# Patient Record
Sex: MALE | Race: OTHER | HISPANIC OR LATINO | URBAN - METROPOLITAN AREA
[De-identification: names, ages, dates, MRNs, and addresses within clinical notes are randomized per-mention and may not be internally consistent; named-entity substitution may affect disease eponyms.]

---

## 2023-02-13 ENCOUNTER — EMERGENCY (EMERGENCY)
Facility: HOSPITAL | Age: 53
LOS: 1 days | Discharge: ANOTHER HOSPITAL | End: 2023-02-13
Attending: STUDENT IN AN ORGANIZED HEALTH CARE EDUCATION/TRAINING PROGRAM
Payer: MEDICAID

## 2023-02-13 VITALS
HEIGHT: 68 IN | DIASTOLIC BLOOD PRESSURE: 90 MMHG | RESPIRATION RATE: 16 BRPM | WEIGHT: 165.35 LBS | SYSTOLIC BLOOD PRESSURE: 137 MMHG | HEART RATE: 63 BPM | TEMPERATURE: 98 F | OXYGEN SATURATION: 96 %

## 2023-02-13 VITALS
RESPIRATION RATE: 18 BRPM | TEMPERATURE: 99 F | DIASTOLIC BLOOD PRESSURE: 94 MMHG | SYSTOLIC BLOOD PRESSURE: 145 MMHG | HEART RATE: 82 BPM | OXYGEN SATURATION: 95 %

## 2023-02-13 LAB
ALBUMIN SERPL ELPH-MCNC: 3.6 G/DL — SIGNIFICANT CHANGE UP (ref 3.5–5)
ALP SERPL-CCNC: 61 U/L — SIGNIFICANT CHANGE UP (ref 40–120)
ALT FLD-CCNC: 32 U/L DA — SIGNIFICANT CHANGE UP (ref 10–60)
ANION GAP SERPL CALC-SCNC: 5 MMOL/L — SIGNIFICANT CHANGE UP (ref 5–17)
APTT BLD: 33.9 SEC — SIGNIFICANT CHANGE UP (ref 27.5–35.5)
AST SERPL-CCNC: 26 U/L — SIGNIFICANT CHANGE UP (ref 10–40)
BASOPHILS # BLD AUTO: 0.02 K/UL — SIGNIFICANT CHANGE UP (ref 0–0.2)
BASOPHILS NFR BLD AUTO: 0.3 % — SIGNIFICANT CHANGE UP (ref 0–2)
BILIRUB SERPL-MCNC: 0.5 MG/DL — SIGNIFICANT CHANGE UP (ref 0.2–1.2)
BLD GP AB SCN SERPL QL: SIGNIFICANT CHANGE UP
BUN SERPL-MCNC: 16 MG/DL — SIGNIFICANT CHANGE UP (ref 7–18)
CALCIUM SERPL-MCNC: 8.9 MG/DL — SIGNIFICANT CHANGE UP (ref 8.4–10.5)
CHLORIDE SERPL-SCNC: 106 MMOL/L — SIGNIFICANT CHANGE UP (ref 96–108)
CO2 SERPL-SCNC: 25 MMOL/L — SIGNIFICANT CHANGE UP (ref 22–31)
CREAT SERPL-MCNC: 0.85 MG/DL — SIGNIFICANT CHANGE UP (ref 0.5–1.3)
EGFR: 105 ML/MIN/1.73M2 — SIGNIFICANT CHANGE UP
EOSINOPHIL # BLD AUTO: 0.01 K/UL — SIGNIFICANT CHANGE UP (ref 0–0.5)
EOSINOPHIL NFR BLD AUTO: 0.2 % — SIGNIFICANT CHANGE UP (ref 0–6)
GLUCOSE SERPL-MCNC: 129 MG/DL — HIGH (ref 70–99)
HCT VFR BLD CALC: 46.3 % — SIGNIFICANT CHANGE UP (ref 39–50)
HGB BLD-MCNC: 15.5 G/DL — SIGNIFICANT CHANGE UP (ref 13–17)
IMM GRANULOCYTES NFR BLD AUTO: 0.3 % — SIGNIFICANT CHANGE UP (ref 0–0.9)
INR BLD: 1.05 RATIO — SIGNIFICANT CHANGE UP (ref 0.88–1.16)
LYMPHOCYTES # BLD AUTO: 0.74 K/UL — LOW (ref 1–3.3)
LYMPHOCYTES # BLD AUTO: 11.3 % — LOW (ref 13–44)
MCHC RBC-ENTMCNC: 30.8 PG — SIGNIFICANT CHANGE UP (ref 27–34)
MCHC RBC-ENTMCNC: 33.5 GM/DL — SIGNIFICANT CHANGE UP (ref 32–36)
MCV RBC AUTO: 91.9 FL — SIGNIFICANT CHANGE UP (ref 80–100)
MONOCYTES # BLD AUTO: 0.38 K/UL — SIGNIFICANT CHANGE UP (ref 0–0.9)
MONOCYTES NFR BLD AUTO: 5.8 % — SIGNIFICANT CHANGE UP (ref 2–14)
NEUTROPHILS # BLD AUTO: 5.37 K/UL — SIGNIFICANT CHANGE UP (ref 1.8–7.4)
NEUTROPHILS NFR BLD AUTO: 82.1 % — HIGH (ref 43–77)
NRBC # BLD: 0 /100 WBCS — SIGNIFICANT CHANGE UP (ref 0–0)
PLATELET # BLD AUTO: 154 K/UL — SIGNIFICANT CHANGE UP (ref 150–400)
POTASSIUM SERPL-MCNC: 3.8 MMOL/L — SIGNIFICANT CHANGE UP (ref 3.5–5.3)
POTASSIUM SERPL-SCNC: 3.8 MMOL/L — SIGNIFICANT CHANGE UP (ref 3.5–5.3)
PROT SERPL-MCNC: 7.1 G/DL — SIGNIFICANT CHANGE UP (ref 6–8.3)
PROTHROM AB SERPL-ACNC: 12.5 SEC — SIGNIFICANT CHANGE UP (ref 10.5–13.4)
RBC # BLD: 5.04 M/UL — SIGNIFICANT CHANGE UP (ref 4.2–5.8)
RBC # FLD: 13.1 % — SIGNIFICANT CHANGE UP (ref 10.3–14.5)
SARS-COV-2 RNA SPEC QL NAA+PROBE: SIGNIFICANT CHANGE UP
SODIUM SERPL-SCNC: 136 MMOL/L — SIGNIFICANT CHANGE UP (ref 135–145)
WBC # BLD: 6.54 K/UL — SIGNIFICANT CHANGE UP (ref 3.8–10.5)
WBC # FLD AUTO: 6.54 K/UL — SIGNIFICANT CHANGE UP (ref 3.8–10.5)

## 2023-02-13 PROCEDURE — 93005 ELECTROCARDIOGRAM TRACING: CPT

## 2023-02-13 PROCEDURE — 99291 CRITICAL CARE FIRST HOUR: CPT

## 2023-02-13 PROCEDURE — 99285 EMERGENCY DEPT VISIT HI MDM: CPT | Mod: 25

## 2023-02-13 PROCEDURE — 87635 SARS-COV-2 COVID-19 AMP PRB: CPT

## 2023-02-13 PROCEDURE — 71101 X-RAY EXAM UNILAT RIBS/CHEST: CPT

## 2023-02-13 PROCEDURE — 36415 COLL VENOUS BLD VENIPUNCTURE: CPT

## 2023-02-13 PROCEDURE — 85610 PROTHROMBIN TIME: CPT

## 2023-02-13 PROCEDURE — 70486 CT MAXILLOFACIAL W/O DYE: CPT | Mod: MA

## 2023-02-13 PROCEDURE — 70486 CT MAXILLOFACIAL W/O DYE: CPT | Mod: 26,MA

## 2023-02-13 PROCEDURE — 93010 ELECTROCARDIOGRAM REPORT: CPT

## 2023-02-13 PROCEDURE — 86901 BLOOD TYPING SEROLOGIC RH(D): CPT

## 2023-02-13 PROCEDURE — 99292 CRITICAL CARE ADDL 30 MIN: CPT

## 2023-02-13 PROCEDURE — 80053 COMPREHEN METABOLIC PANEL: CPT

## 2023-02-13 PROCEDURE — 72170 X-RAY EXAM OF PELVIS: CPT

## 2023-02-13 PROCEDURE — 85025 COMPLETE CBC W/AUTO DIFF WBC: CPT

## 2023-02-13 PROCEDURE — 72170 X-RAY EXAM OF PELVIS: CPT | Mod: 26

## 2023-02-13 PROCEDURE — 71101 X-RAY EXAM UNILAT RIBS/CHEST: CPT | Mod: 26

## 2023-02-13 PROCEDURE — 85730 THROMBOPLASTIN TIME PARTIAL: CPT

## 2023-02-13 PROCEDURE — 70450 CT HEAD/BRAIN W/O DYE: CPT | Mod: 26,MA

## 2023-02-13 PROCEDURE — 70450 CT HEAD/BRAIN W/O DYE: CPT | Mod: MA

## 2023-02-13 PROCEDURE — 86850 RBC ANTIBODY SCREEN: CPT

## 2023-02-13 PROCEDURE — 86900 BLOOD TYPING SEROLOGIC ABO: CPT

## 2023-02-13 RX ORDER — IBUPROFEN 200 MG
600 TABLET ORAL ONCE
Refills: 0 | Status: COMPLETED | OUTPATIENT
Start: 2023-02-13 | End: 2023-02-13

## 2023-02-13 RX ORDER — FAMOTIDINE 10 MG/ML
20 INJECTION INTRAVENOUS DAILY
Refills: 0 | Status: DISCONTINUED | OUTPATIENT
Start: 2023-02-13 | End: 2023-02-17

## 2023-02-13 RX ORDER — LIDOCAINE 4 G/100G
1 CREAM TOPICAL ONCE
Refills: 0 | Status: COMPLETED | OUTPATIENT
Start: 2023-02-13 | End: 2023-02-13

## 2023-02-13 RX ORDER — MORPHINE SULFATE 50 MG/1
4 CAPSULE, EXTENDED RELEASE ORAL ONCE
Refills: 0 | Status: DISCONTINUED | OUTPATIENT
Start: 2023-02-13 | End: 2023-02-13

## 2023-02-13 RX ADMIN — MORPHINE SULFATE 4 MILLIGRAM(S): 50 CAPSULE, EXTENDED RELEASE ORAL at 23:20

## 2023-02-13 RX ADMIN — FAMOTIDINE 20 MILLIGRAM(S): 10 INJECTION INTRAVENOUS at 21:41

## 2023-02-13 RX ADMIN — Medication 600 MILLIGRAM(S): at 18:45

## 2023-02-13 RX ADMIN — LIDOCAINE 1 PATCH: 4 CREAM TOPICAL at 21:42

## 2023-02-13 RX ADMIN — LIDOCAINE 1 PATCH: 4 CREAM TOPICAL at 18:46

## 2023-02-13 RX ADMIN — Medication 600 MILLIGRAM(S): at 21:42
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
## 2023-02-13 NOTE — ED ADULT NURSE NOTE - NSIMPLEMENTINTERV_GEN_ALL_ED
Implemented All Fall Risk Interventions:  [place] to call system. Call bell, personal items and telephone within reach. Instruct patient to call for assistance. Room bathroom lighting operational. Non-slip footwear when patient is off stretcher. Physically safe environment: no spills, clutter or unnecessary equipment. Stretcher in lowest position, wheels locked, appropriate side rails in place. Provide visual cue, wrist band, yellow gown, etc. Monitor gait and stability. Monitor for mental status changes and reorient to person, place, and time. Review medications for side effects contributing to fall risk. Reinforce activity limits and safety measures with patient and family.

## 2023-02-13 NOTE — ED PROVIDER NOTE - PROGRESS NOTE DETAILS
Jeffrey Dominguez, PGY-3: Pt reexamined at bedside, resting comfortably, vitals stable. Pain is improved however patient developed some heartburn in the meanwhile we will give some Pepcid.  CT demonstrates right orbital floor and right lateral wall fractures, 1 to 2 mm depression.  Also has right maxillary sinus fracture.  In the interim patient has developed left periorbital swelling, visual acuity is 20/80 no hyphema present.  No ICH on CT head.  Discussed with OMFS ([facility]) via transfer center, likely transfer to [facility].  Results discussed with the patient, pain is well controlled currently, agrees to transfer if still needed Jeffrey Dominguez, PGY-3: Pt requesting transfer to [place] as their insurance is hospital insurance and he is afraid he will not be covered at [facility]. Discussed with transfer center, will try to facilitate transfer to [place] Jeffrey Dominguez, PGY-3: Pt requesting transfer to [place] as their insurance is hospital insurance and he is afraid he will not be covered at [facility]. Discussed with transfer center, will try to facilitate transfer to [facility], Discussed with trauma and ED there. Accepting physician Dr. Appiah.
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
## 2023-02-13 NOTE — ED PROVIDER NOTE - PHYSICAL EXAMINATION
GENERAL: non-toxic appearing, alert, in NAD  HEENT: Tenderness over R maxilla and R temple, atraumatic, normocephalic, Vision grossly intact, no conjunctivitis or discharge, hearing grossly intact,  no nasal discharge, epistaxis   CARDIAC: RRR, normal S1S2,  no appreciable murmurs, no cyanosis, cap refill < 2 seconds  PULM: normal work of breathing, oxygen saturation on RA wnl, CTAB, no crackles, rales, rhonchi, or wheezing  GI: abdomen nondistended, soft, nontender, no guarding or rebound tenderness, no palpable masses  NEURO: awake and alert, follows commands, normal speech, PERRLA, EOMI, no focal motor or sensory deficits  MSK: tenderness over r chest wall, spine appears normal, no joint swelling or erythema, ranging all extremities with no appreciable loss of ROM  EXT: no peripheral edema, calf tenderness, redness or swelling  SKIN: superficial abrasions to knees, warm, dry, and intact, no rashes  PSYCH: appropriate mood and affect GENERAL: non-toxic appearing, alert, in NAD, Speaking in full sentences  HEENT: Tenderness over R maxilla and R temple, atraumatic, normocephalic, Vision grossly intact, No hyphema, no conjunctivitis or discharge, hearing grossly intact,  no nasal discharge, dried blood present in R nare  CARDIAC: RRR, normal S1S2,  no appreciable murmurs, no cyanosis, cap refill < 2 seconds  PULM: normal work of breathing, oxygen saturation on RA wnl, CTAB, no crackles, rales, rhonchi, or wheezing  GI: abdomen nondistended, soft, nontender, no guarding or rebound tenderness, no palpable masses  NEURO: awake and alert, follows commands, normal speech, PERRLA, EOMI, no focal motor or sensory deficits, GCS 15  MSK: tenderness over r chest wall, FROM of neck with no midline or paraspinal tenderness, spine appears normal, no joint swelling or erythema, ranging all extremities with no appreciable loss of ROM  EXT: no peripheral edema, calf tenderness, redness or swelling  SKIN: superficial abrasions to knees, warm, dry, and intact, no rashes  PSYCH: appropriate mood and affect